# Patient Record
Sex: MALE | Race: WHITE | HISPANIC OR LATINO | URBAN - METROPOLITAN AREA
[De-identification: names, ages, dates, MRNs, and addresses within clinical notes are randomized per-mention and may not be internally consistent; named-entity substitution may affect disease eponyms.]

---

## 2020-07-27 ENCOUNTER — OFFICE VISIT (OUTPATIENT)
Dept: UROLOGY | Facility: CLINIC | Age: 35
End: 2020-07-27
Payer: COMMERCIAL

## 2020-07-27 VITALS
HEIGHT: 66 IN | DIASTOLIC BLOOD PRESSURE: 86 MMHG | SYSTOLIC BLOOD PRESSURE: 124 MMHG | WEIGHT: 180.13 LBS | HEART RATE: 78 BPM | BODY MASS INDEX: 28.95 KG/M2

## 2020-07-27 DIAGNOSIS — A63.0 CONDYLOMA ACUMINATA: ICD-10-CM

## 2020-07-27 PROCEDURE — 99999 PR PBB SHADOW E&M-NEW PATIENT-LVL III: ICD-10-PCS | Mod: PBBFAC,,, | Performed by: UROLOGY

## 2020-07-27 PROCEDURE — 99999 PR PBB SHADOW E&M-NEW PATIENT-LVL III: CPT | Mod: PBBFAC,,, | Performed by: UROLOGY

## 2020-07-27 PROCEDURE — 99203 PR OFFICE/OUTPT VISIT, NEW, LEVL III, 30-44 MIN: ICD-10-PCS | Mod: S$GLB,,, | Performed by: UROLOGY

## 2020-07-27 PROCEDURE — 3008F PR BODY MASS INDEX (BMI) DOCUMENTED: ICD-10-PCS | Mod: CPTII,S$GLB,, | Performed by: UROLOGY

## 2020-07-27 PROCEDURE — 99203 OFFICE O/P NEW LOW 30 MIN: CPT | Mod: S$GLB,,, | Performed by: UROLOGY

## 2020-07-27 PROCEDURE — 3008F BODY MASS INDEX DOCD: CPT | Mod: CPTII,S$GLB,, | Performed by: UROLOGY

## 2020-07-27 RX ORDER — PODOFILOX 5 MG/ML
SOLUTION TOPICAL 2 TIMES DAILY
Qty: 3.5 ML | Refills: 1 | Status: SHIPPED | OUTPATIENT
Start: 2020-07-27 | End: 2020-07-27

## 2020-07-27 RX ORDER — PODOFILOX 5 MG/ML
SOLUTION TOPICAL 2 TIMES DAILY
Qty: 3.5 ML | Refills: 1 | Status: SHIPPED | OUTPATIENT
Start: 2020-07-27 | End: 2020-08-06

## 2020-07-27 RX ORDER — PODOFILOX 5 MG/ML
SOLUTION TOPICAL 2 TIMES DAILY
Qty: 3.5 ML | Refills: 1 | Status: SHIPPED | OUTPATIENT
Start: 2020-07-27 | End: 2020-07-27 | Stop reason: SDUPTHER

## 2020-07-27 RX ORDER — PODOFILOX 5 MG/ML
SOLUTION TOPICAL 2 TIMES DAILY
Qty: 3.5 ML | Refills: 1 | OUTPATIENT
Start: 2020-07-27 | End: 2020-07-27 | Stop reason: SDUPTHER

## 2020-07-28 NOTE — PROGRESS NOTES
CC: genital wart    Joel Beard is a 35 y.o. man who is here for the evaluation of penile wart (one wart)    A new pt.  Hx of genital warts in the past.  Was treated with cautery for the growth in the penis and suprapubic area in the past.  Now he has noticed a small lesion on the left side of the penis.  He is originally from [place].  We had a [place]  today for his visit.  He is  for 2 years.  His wife was checked by her OB/ gyn for genital warts and pelvic exam, and she came back negative.  He denies any other STD.    History reviewed. No pertinent past medical history.  History reviewed. No pertinent surgical history.  Social History     Tobacco Use    Smoking status: Never Smoker   Substance Use Topics    Alcohol use: Never     Frequency: Never    Drug use: Never     History reviewed. No pertinent family history.  Allergy:  Review of patient's allergies indicates:  No Known Allergies  Outpatient Encounter Medications as of 7/27/2020   Medication Sig Dispense Refill    podofilox (CONDYLOX) 0.5 % external solution Apply topically 2 (two) times daily. every 12 hours in the morning and evening for 3 days, then withhold for 4 days; repeat cycle up to 4 times. for 10 days 3.5 mL 1    [DISCONTINUED] podofilox (CONDYLOX) 0.5 % external solution Apply topically 2 (two) times daily. every 12 hours in the morning and evening for 3 days, then withhold for 4 days; repeat cycle up to 4 times. for 10 days 3.5 mL 1    [DISCONTINUED] podofilox (CONDYLOX) 0.5 % external solution Apply topically 2 (two) times daily. every 12 hours in the morning and evening for 3 days, then withhold for 4 days; repeat cycle up to 4 times. for 10 days 3.5 mL 1    [DISCONTINUED] podofilox (CONDYLOX) 0.5 % external solution Apply topically 2 (two) times daily. every 12 hours in the morning and evening for 3 days, then withhold for 4 days; repeat cycle up to 4 times. for 10 days 3.5 mL 1     No facility-administered  encounter medications on file as of 7/27/2020.      Review of Systems   ROS  Physical Exam     Vitals:    07/27/20 1003   BP: 124/86   Pulse: 78     Physical Exam  Genitalia:  Scrotum: no rash or lesion  Normal symmetric epididymis without masses  Normal vas palpated  Normal size, symmetric testicles with no masses   Normal urethral meatus with no discharge  Normal circumcised penis with a 5 mm raised growth on the left side of the penile shaft.   Rectal:  Normal perineum and anus upon inspection.  Normal tone, no masses or tenderness;     LABS:  No results found for: PSA, PSADIAG, PSATOTAL, PSAFREE, PSAFREEPCT  No results found for this or any previous visit.  No results found for: CREATININE  No results found for this or any previous visit.  No results found for: LABURIN  No results found for: HGBA1C    Radiology:    Assessment and Plan:  Joel was seen today for penile wart.    Diagnoses and all orders for this visit:    Condyloma acuminata  -     Discontinue: podofilox (CONDYLOX) 0.5 % external solution; Apply topically 2 (two) times daily. every 12 hours in the morning and evening for 3 days, then withhold for 4 days; repeat cycle up to 4 times. for 10 days  -     Discontinue: podofilox (CONDYLOX) 0.5 % external solution; Apply topically 2 (two) times daily. every 12 hours in the morning and evening for 3 days, then withhold for 4 days; repeat cycle up to 4 times. for 10 days  -     Discontinue: podofilox (CONDYLOX) 0.5 % external solution; Apply topically 2 (two) times daily. every 12 hours in the morning and evening for 3 days, then withhold for 4 days; repeat cycle up to 4 times. for 10 days  -     podofilox (CONDYLOX) 0.5 % external solution; Apply topically 2 (two) times daily. every 12 hours in the morning and evening for 3 days, then withhold for 4 days; repeat cycle up to 4 times. for 10 days    options of treatment explained.  Will try a medical therapy using Condylox first for up to 4 wks.  If not  improving, then will do electrical cautery to remove the wart.  All questions answered.    Follow-up:  Follow up if symptoms worsen or fail to improve.

## 2021-07-21 ENCOUNTER — TELEPHONE (OUTPATIENT)
Dept: NEUROLOGY | Facility: CLINIC | Age: 36
End: 2021-07-21
Payer: COMMERCIAL

## 2021-12-24 ENCOUNTER — PATIENT MESSAGE (OUTPATIENT)
Dept: ADMINISTRATIVE | Facility: OTHER | Age: 36
End: 2021-12-24
Payer: COMMERCIAL

## 2021-12-24 ENCOUNTER — HOSPITAL ENCOUNTER (EMERGENCY)
Facility: OTHER | Age: 36
Discharge: HOME | End: 2021-12-24
Attending: EMERGENCY MEDICINE
Payer: COMMERCIAL

## 2021-12-24 VITALS
WEIGHT: 174.19 LBS | TEMPERATURE: 98 F | OXYGEN SATURATION: 98 % | HEART RATE: 80 BPM | RESPIRATION RATE: 16 BRPM | BODY MASS INDEX: 27.99 KG/M2 | HEIGHT: 66 IN | SYSTOLIC BLOOD PRESSURE: 122 MMHG | DIASTOLIC BLOOD PRESSURE: 84 MMHG

## 2021-12-24 DIAGNOSIS — Z20.822 LAB TEST NEGATIVE FOR COVID-19 VIRUS: Primary | ICD-10-CM

## 2021-12-24 LAB
CTP QC/QA: YES
SARS-COV-2 RDRP RESP QL NAA+PROBE: NEGATIVE

## 2021-12-24 PROCEDURE — 99282 EMERGENCY DEPT VISIT SF MDM: CPT | Mod: 25

## 2021-12-24 PROCEDURE — U0002 COVID-19 LAB TEST NON-CDC: HCPCS | Performed by: EMERGENCY MEDICINE

## 2021-12-25 NOTE — ED PROVIDER NOTES
"  Source of History:  Medical record, patient    Chief complaint:  Per triage note: "COVID-19 Concerns (Pt wants covid test, asymptomatic, no acute distress noted. )  "    HPI:    Joel Beard is a 36 y.o. male who presents requesting COVID-19 PCR test for travel.  Denies any symptoms.  He states that his flight to [place] is at about 1100 hrs. He has been vaccinated against COVID19.     This is the extent of the patient's complaints at this time.     ROS:   As per HPI and below:   General: No fever.   HENT: No facial pain.   Eyes: No eye pain.   Cardiovascular: No chest pain.   Respiratory:  No dyspnea.   GI: No abdominal pain. No nausea. No vomiting. No diarrhea.   Skin: No rashes.   Neuro:  No syncope.  No focal deficits.   Musculoskeletal: No extremity pain.  All other systems reviewed and are negative.      Review of patient's allergies indicates:  No Known Allergies    PMH:  As per HPI and below:  No past medical history on file.    No past surgical history on file.    Social History     Tobacco Use    Smoking status: Never Smoker   Substance Use Topics    Alcohol use: Never    Drug use: Never       Physical Exam:    Nursing note and vitals reviewed.  /84 (BP Location: Right arm, Patient Position: Sitting)   Pulse 80   Temp 98.4 °F (36.9 °C) (Oral)   Resp 16   Ht 5' 6" (1.676 m)   Wt 79 kg (174 lb 2.6 oz)   SpO2 98%   BMI 28.11 kg/m²   Constitutional: AAOx3. No distress.  Eyes: EOMI. No discharge. Anicteric.  HENT:   Neck: Normal range of motion. Neck supple.  Cardiovascular: Normal rate.  Regular rhythm.    Pulmonary/Chest: No respiratory distress. Effort normal.  No tachypnea.  Abdominal: No distension and no mass.   Musculoskeletal: Normal range of motion.   Neurological: GCS 15. Alert and oriented to person, place, and time. No gross cranial nerve, light touch or strength deficit. Coordination normal.   Skin: Skin is warm and dry.   EXT: 2+ radial pulses.   Psychiatric: Behavior is " normal. Judgment normal.        MDM:    I decided to obtain the patient's medical records.  Patient is a 36-year-old male with no reported past medical history who presents requesting COVID-19 PCR test for travel.  Pt's  Nucleic Acid Amplification-based [employer] IDNow Point of care COVID-19 test was negative. Unclear if the airline would consider this test sufficient. PCR sample taken and sent, but unclear if it will result in time for his flight.   All questions answered.    ED Course as of 12/24/21 2134   Fri Dec 24, 2021   1843 Asymptomatic.  Requests PCR test for travel to [place] pillar [RC]      ED Course User Index  [RC] Nikolas Ragland MD       Medications - No data to display         Procedures        Diagnostic Impression:    1. Lab test negative for COVID-19 virus         ED Disposition Condition    Discharge Good        ED Prescriptions     None        Follow-up Information    None          Nikolas Ragland MD  12/24/21 2134

## 2022-01-30 ENCOUNTER — HOSPITAL ENCOUNTER (EMERGENCY)
Facility: HOSPITAL | Age: 37
Discharge: HOME | End: 2022-01-30
Attending: EMERGENCY MEDICINE
Payer: COMMERCIAL

## 2022-01-30 VITALS
WEIGHT: 174.19 LBS | TEMPERATURE: 99 F | HEART RATE: 96 BPM | DIASTOLIC BLOOD PRESSURE: 75 MMHG | HEIGHT: 66 IN | OXYGEN SATURATION: 99 % | SYSTOLIC BLOOD PRESSURE: 123 MMHG | RESPIRATION RATE: 18 BRPM | BODY MASS INDEX: 27.99 KG/M2

## 2022-01-30 DIAGNOSIS — R19.7 DIARRHEA, UNSPECIFIED TYPE: Primary | ICD-10-CM

## 2022-01-30 LAB
ALBUMIN SERPL BCP-MCNC: 4.2 G/DL (ref 3.5–5.2)
ALP SERPL-CCNC: 84 U/L (ref 55–135)
ALT SERPL W/O P-5'-P-CCNC: 19 U/L (ref 10–44)
ANION GAP SERPL CALC-SCNC: 12 MMOL/L (ref 8–16)
AST SERPL-CCNC: 30 U/L (ref 10–40)
BASOPHILS # BLD AUTO: 0.02 K/UL (ref 0–0.2)
BASOPHILS NFR BLD: 0.3 % (ref 0–1.9)
BILIRUB SERPL-MCNC: 0.5 MG/DL (ref 0.1–1)
BUN SERPL-MCNC: 16 MG/DL (ref 6–20)
CALCIUM SERPL-MCNC: 9.3 MG/DL (ref 8.7–10.5)
CHLORIDE SERPL-SCNC: 101 MMOL/L (ref 95–110)
CO2 SERPL-SCNC: 21 MMOL/L (ref 23–29)
CREAT SERPL-MCNC: 1.4 MG/DL (ref 0.5–1.4)
CTP QC/QA: YES
DIFFERENTIAL METHOD: ABNORMAL
EOSINOPHIL # BLD AUTO: 0 K/UL (ref 0–0.5)
EOSINOPHIL NFR BLD: 0.3 % (ref 0–8)
ERYTHROCYTE [DISTWIDTH] IN BLOOD BY AUTOMATED COUNT: 13.4 % (ref 11.5–14.5)
EST. GFR  (AFRICAN AMERICAN): >60 ML/MIN/1.73 M^2
EST. GFR  (NON AFRICAN AMERICAN): >60 ML/MIN/1.73 M^2
GLUCOSE SERPL-MCNC: 83 MG/DL (ref 70–110)
HCT VFR BLD AUTO: 52 % (ref 40–54)
HGB BLD-MCNC: 17.4 G/DL (ref 14–18)
IMM GRANULOCYTES # BLD AUTO: 0.03 K/UL (ref 0–0.04)
IMM GRANULOCYTES NFR BLD AUTO: 0.4 % (ref 0–0.5)
LIPASE SERPL-CCNC: 16 U/L (ref 4–60)
LYMPHOCYTES # BLD AUTO: 0.8 K/UL (ref 1–4.8)
LYMPHOCYTES NFR BLD: 10.2 % (ref 18–48)
MCH RBC QN AUTO: 28.7 PG (ref 27–31)
MCHC RBC AUTO-ENTMCNC: 33.5 G/DL (ref 32–36)
MCV RBC AUTO: 86 FL (ref 82–98)
MONOCYTES # BLD AUTO: 1.1 K/UL (ref 0.3–1)
MONOCYTES NFR BLD: 14.2 % (ref 4–15)
NEUTROPHILS # BLD AUTO: 5.7 K/UL (ref 1.8–7.7)
NEUTROPHILS NFR BLD: 74.6 % (ref 38–73)
NRBC BLD-RTO: 0 /100 WBC
PLATELET # BLD AUTO: 236 K/UL (ref 150–450)
PMV BLD AUTO: 9.4 FL (ref 9.2–12.9)
POTASSIUM SERPL-SCNC: 4.2 MMOL/L (ref 3.5–5.1)
PROT SERPL-MCNC: 9 G/DL (ref 6–8.4)
RBC # BLD AUTO: 6.07 M/UL (ref 4.6–6.2)
SARS-COV-2 RDRP RESP QL NAA+PROBE: NEGATIVE
SODIUM SERPL-SCNC: 134 MMOL/L (ref 136–145)
WBC # BLD AUTO: 7.63 K/UL (ref 3.9–12.7)
WBC #/AREA STL HPF: NORMAL /[HPF]

## 2022-01-30 PROCEDURE — 87209 SMEAR COMPLEX STAIN: CPT | Performed by: NURSE PRACTITIONER

## 2022-01-30 PROCEDURE — 85025 COMPLETE CBC W/AUTO DIFF WBC: CPT | Performed by: NURSE PRACTITIONER

## 2022-01-30 PROCEDURE — 87177 OVA AND PARASITES SMEARS: CPT | Performed by: NURSE PRACTITIONER

## 2022-01-30 PROCEDURE — 96360 HYDRATION IV INFUSION INIT: CPT

## 2022-01-30 PROCEDURE — 89055 LEUKOCYTE ASSESSMENT FECAL: CPT | Performed by: NURSE PRACTITIONER

## 2022-01-30 PROCEDURE — 87427 SHIGA-LIKE TOXIN AG IA: CPT | Mod: 59 | Performed by: NURSE PRACTITIONER

## 2022-01-30 PROCEDURE — 87329 GIARDIA AG IA: CPT | Performed by: NURSE PRACTITIONER

## 2022-01-30 PROCEDURE — 87425 ROTAVIRUS AG IA: CPT | Performed by: NURSE PRACTITIONER

## 2022-01-30 PROCEDURE — 87045 FECES CULTURE AEROBIC BACT: CPT | Performed by: NURSE PRACTITIONER

## 2022-01-30 PROCEDURE — U0002 COVID-19 LAB TEST NON-CDC: HCPCS | Performed by: EMERGENCY MEDICINE

## 2022-01-30 PROCEDURE — 99282 PR EMERGENCY DEPT VISIT,LEVEL II: ICD-10-PCS | Mod: CS,,, | Performed by: EMERGENCY MEDICINE

## 2022-01-30 PROCEDURE — 83690 ASSAY OF LIPASE: CPT | Performed by: NURSE PRACTITIONER

## 2022-01-30 PROCEDURE — 80053 COMPREHEN METABOLIC PANEL: CPT | Performed by: NURSE PRACTITIONER

## 2022-01-30 PROCEDURE — 99284 EMERGENCY DEPT VISIT MOD MDM: CPT | Mod: 25

## 2022-01-30 PROCEDURE — 87046 STOOL CULTR AEROBIC BACT EA: CPT | Mod: 59 | Performed by: NURSE PRACTITIONER

## 2022-01-30 PROCEDURE — 99282 EMERGENCY DEPT VISIT SF MDM: CPT | Mod: CS,,, | Performed by: EMERGENCY MEDICINE

## 2022-01-30 PROCEDURE — 87449 NOS EACH ORGANISM AG IA: CPT | Performed by: NURSE PRACTITIONER

## 2022-01-30 PROCEDURE — 82272 OCCULT BLD FECES 1-3 TESTS: CPT | Performed by: NURSE PRACTITIONER

## 2022-01-30 NOTE — ED NOTES
Patient identifiers verified and correct for Mr Feliciano Beard  C/C: Diarrhea SEE NN  APPEARANCE: awake and alert in NAD.  SKIN: warm, dry and intact. No breakdown or bruising.  MUSCULOSKELETAL: Patient moving all extremities spontaneously, no obvious swelling or deformities noted. Ambulates independently.  RESPIRATORY: Denies shortness of breath.Respirations unlabored.   CARDIAC: Denies CP, 2+ distal pulses; no peripheral edema  ABDOMEN: Denies abd pain, reports diarrhea xs3 today   : voids spontaneously, denies difficulty  Neurologic: AAO x 4; follows commands equal strength in all extremities; denies numbness/tingling. Denies dizziness Denies wekaness

## 2022-01-30 NOTE — ED PROVIDER NOTES
Chief complaint:  Diarrhea      HPI:  Joel Beard is a 37 y.o. male presenting for evaluation of diarrhea and rectal pain.  He reports that his diarrhea began on Friday.  He reports watery yellow stools every 2 hours.  On Friday night, it kept him up all night.  He denies any abdominal pain, vomiting or fever.  He has had a decreased appetite and has only been able to eat noodles and mashed potatoes.  He is drinking Gatorade.  He is taking over-the-counter Imodium without improvement.  He recently traveled to [place] and returned on January 10th.  He has not been on any recent antibiotics.  He states that after multiple episodes of diarrhea, he began having rectal pain.  He believes this may be due to wiping.  He is using baby wipes.  He denies any bleeding from the rectum.  He has never had the symptoms in the past.  He denies any other problems or concerns at this time.      Review of patient's allergies indicates:  No Known Allergies    No current facility-administered medications on file prior to encounter.     No current outpatient medications on file prior to encounter.       PMH:  As per HPI and below:  History reviewed. No pertinent past medical history.  History reviewed. No pertinent surgical history.    Social History     Socioeconomic History    Marital status:    Tobacco Use    Smoking status: Never Smoker    Smokeless tobacco: Never Used   Substance and Sexual Activity    Alcohol use: Never    Drug use: Never       History reviewed. No pertinent family history.    Review of Systems  As per HPI and Below  Constitutional: Negative for chills and fever.   HENT: Negative for congestion and sinus pressure.    Eyes: Negative for visual disturbance.   Respiratory: Negative for cough, chest tightness and shortness of breath.    Cardiovascular: Negative for chest pain.   Gastrointestinal:  Positive for diarrhea.  Negative for nausea or vomiting.  Negative for abdominal pain.  Negative for  rectal bleeding.  Positive for rectal pain.  Genitourinary: Negative for flank pain. Negative for dysuria.  Musculoskeletal: Negative for arthralgias and myalgias.   Skin: Negative for rash and wound.   Neurological: Negative for dizziness. Negative for weakness and numbness.   Psychiatric/Behavioral: Negative for confusion.       Physical Exam:    Vitals:    01/30/22 1206   BP: 123/75   Pulse: 96   Resp: 18   Temp:      Nursing note and vitals reviewed.  Constitutional: Patient appears well-developed and well-nourished. Not diaphoretic. No distress.   HENT:   Head: Normocephalic and atraumatic.   Eyes: Conjunctivae are normal. No scleral icterus.   Neck: Normal range of motion. Neck supple.   Cardiovascular: Normal rate, regular rhythm and normal heart sounds.   Pulmonary/Chest: Breath sounds normal. No respiratory distress.  Abdominal:  The abdomen is soft and nondistended.  Normal bowel sounds.  There is no tenderness to palpation.  Rectal exam is unremarkable.  There are no fissures or external hemorrhoids noted.    Musculoskeletal: Normal range of motion. No edema or tenderness.   Neurological: Alert and oriented to person, place, and time. Normal strength. No sensory deficit.   Skin: Skin is warm and dry. No rash noted. No erythema. No pallor.   Psychiatric: Normal mood and affect. Thought content normal.       Labs Reviewed   CBC W/ AUTO DIFFERENTIAL - Abnormal; Notable for the following components:       Result Value    Lymph # 0.8 (*)     Mono # 1.1 (*)     Gran % 74.6 (*)     Lymph % 10.2 (*)     All other components within normal limits   COMPREHENSIVE METABOLIC PANEL - Abnormal; Notable for the following components:    Sodium 134 (*)     CO2 21 (*)     Total Protein 9.0 (*)     All other components within normal limits   OCCULT BLOOD X 1, STOOL - Abnormal; Notable for the following components:    Occult Blood Positive (*)     All other components within normal limits   ROTAVIRUS ANTIGEN, STOOL -  Abnormal; Notable for the following components:    Rotavirus Positive (*)     All other components within normal limits   CLOSTRIDIUM DIFFICILE   CULTURE, STOOL   ENTEROHEMORRHAGIC E.COLI   LIPASE   WBC, STOOL   GIARDIA/CRYPTOSPORIDIUM (EIA)   STOOL EXAM-OVA,CYSTS,PARASITES   SARS-COV-2 RDRP GENE         Imaging Results    None           No results found.        MDM:    37 y.o. male 3 days of watery yellow diarrhea and subsequent rectal pain.  His abdomen is benign on exam.  He denies having had any abdominal pain, nausea or vomiting.  He is afebrile, nontoxic and nondistressed.  He is slightly tachycardic at 109 beats per minute.  He is concerned because he has had limited food and fluid intake over the past several days.  Labs including CBC, CMP, lipase and stool cultures the have been ordered.  Will give IV fluids in the ED.  I do not think that imaging is warranted at this time.    On reassessment the patient states he is feeling better after receiving fluids. He is stable for discharge home with outpatient follow up. He may continue Imodium per package instructions. Strict ED return precautions discussed.    Diagnoses:  The encounter diagnosis was Diarrhea, unspecified type.         Chantel Rabago NP  01/30/22 2035      Attending Attestation:  I discussed the management of the patient with the ANTHONY, but the face-to-face encounter was performed by the ANTHONY.  Irvin.     Jake Bryan MD  01/31/22 2799

## 2022-01-30 NOTE — ED NOTES
Patient states diarrhea x 3 days,  deneis abd pain , deneis bleeding, Immodium at 0300. In [place] from Dec 28-[place] 10.

## 2022-01-31 ENCOUNTER — TELEPHONE (OUTPATIENT)
Dept: EMERGENCY MEDICINE | Facility: HOSPITAL | Age: 37
End: 2022-01-31
Payer: COMMERCIAL

## 2022-01-31 LAB
C DIFF GDH STL QL: NEGATIVE
C DIFF TOX A+B STL QL IA: NEGATIVE
CRYPTOSP AG STL QL IA: NEGATIVE
E COLI SXT1 STL QL IA: NEGATIVE
E COLI SXT2 STL QL IA: NEGATIVE
G LAMBLIA AG STL QL IA: NEGATIVE
OB PNL STL: POSITIVE
RV AG STL QL IA.RAPID: POSITIVE

## 2022-01-31 NOTE — TELEPHONE ENCOUNTER
Patient seen in the ED for diarrhea, Rotavirus test is positive. Patient is Ukrainian speaking, called through  line to explain results. I explained that there is no specific treatment for rotavirus but that ist is very contagious so he should be vigilant about handwashing and sanitizing surfaces. Instructed patient to stay hydrated. He states he is feeling better. All questions answered.

## 2022-02-02 LAB
BACTERIA STL CULT: NORMAL
O+P STL MICRO: NORMAL

## 2024-08-12 ENCOUNTER — HOSPITAL ENCOUNTER (EMERGENCY)
Facility: HOSPITAL | Age: 39
Discharge: HOME | End: 2024-08-12
Attending: EMERGENCY MEDICINE
Payer: COMMERCIAL

## 2024-08-12 VITALS
RESPIRATION RATE: 16 BRPM | OXYGEN SATURATION: 96 % | HEIGHT: 66 IN | DIASTOLIC BLOOD PRESSURE: 71 MMHG | BODY MASS INDEX: 29.05 KG/M2 | SYSTOLIC BLOOD PRESSURE: 139 MMHG | HEART RATE: 73 BPM | TEMPERATURE: 99 F | WEIGHT: 180.75 LBS

## 2024-08-12 DIAGNOSIS — L55.0 SUNBURN OF FIRST DEGREE: Primary | ICD-10-CM

## 2024-08-12 DIAGNOSIS — L29.9 PRURITUS: ICD-10-CM

## 2024-08-12 PROCEDURE — 99283 EMERGENCY DEPT VISIT LOW MDM: CPT

## 2024-08-12 RX ORDER — HYDROXYZINE HYDROCHLORIDE 25 MG/1
25 TABLET, FILM COATED ORAL 4 TIMES DAILY PRN
Qty: 20 TABLET | Refills: 0 | Status: SHIPPED | OUTPATIENT
Start: 2024-08-12

## 2024-08-12 NOTE — ED PROVIDER NOTES
Encounter Date: 8/12/2024       History     Chief Complaint   Patient presents with    Sunburn     Upper back and itching     39-year-old male with no significant past medical history presents with sunburn and itchy skin.  He got a sunburn Saturday.  He has had itching to the shoulders and upper back ever since.  Symptoms were so severe today that he did not go to work.  In the past he was prescribed an allergy medication that improved his symptoms.  Patient denies nausea, vomiting, diarrhea, fever, cough, shortness of breath, chest pain, abdominal pain, or dysuria.  NKDA.  The patients available PMH, PSH, Social History, medications, allergies, and triage vital signs were reviewed just prior to their medical evaluation.    French translation service used throughout.         Review of patient's allergies indicates:  No Known Allergies  History reviewed. No pertinent past medical history.  History reviewed. No pertinent surgical history.  No family history on file.  Social History     Tobacco Use    Smoking status: Never    Smokeless tobacco: Never   Substance Use Topics    Alcohol use: Never    Drug use: Never     Review of Systems   Constitutional:  Negative for fever.   Respiratory:  Negative for cough and shortness of breath.    Cardiovascular:  Negative for chest pain.   Gastrointestinal:  Negative for abdominal pain, diarrhea, nausea and vomiting.   Genitourinary:  Negative for dysuria.   Skin:  Positive for color change and rash.       Physical Exam     Initial Vitals [08/12/24 1409]   BP Pulse Resp Temp SpO2   139/71 73 16 98.8 °F (37.1 °C) 96 %      MAP       --         Physical Exam    Nursing note and vitals reviewed.  Constitutional: He appears well-developed and well-nourished. He is not diaphoretic. No distress.   HENT:   Head: Normocephalic and atraumatic.   Nose: Nose normal.   Eyes: Conjunctivae are normal. Right eye exhibits no discharge. Left eye exhibits no discharge.   Neck: Neck supple.    Normal range of motion.  Cardiovascular:  Normal rate, regular rhythm and normal heart sounds.     Exam reveals no gallop and no friction rub.       No murmur heard.  Pulmonary/Chest: Breath sounds normal. No respiratory distress. He has no wheezes. He has no rhonchi. He has no rales.   Abdominal: He exhibits no distension.   Musculoskeletal:         General: No tenderness or edema. Normal range of motion.      Cervical back: Normal range of motion and neck supple.     Neurological: He is alert and oriented to person, place, and time. He has normal strength. GCS score is 15. GCS eye subscore is 4. GCS verbal subscore is 5. GCS motor subscore is 6.   Skin: Skin is warm and dry. Rash noted. No erythema.   Sunburn to back and shoulders, no blisters, no hives, no cellulitis/abscess   Psychiatric: He has a normal mood and affect. His behavior is normal. Judgment and thought content normal.         ED Course   Procedures  Labs Reviewed - No data to display       Imaging Results    None          Medications - No data to display  Medical Decision Making  39-year-old male presents with sunburn and itchy skin.  Vitals normal.  Physical exam as above.  No indication for labs or imaging.  Doubt SJS/TENS/SSS or any acute life threat.  Doubt candida, cellulitis, allergic reaction.  Will treat as outpatient with hydroxyzine and solarcaine.  Patient will call their primary physician tomorrow to schedule close follow-up. Patient will return to ED for worsening symptoms, inability to eat/drink, fever greater than 100.4, or any other concerns. Did bedside teaching with return precautions.  All questions answered.  The patient acknowledges understanding.  Gave written and verbal discharge instructions.     Risk  Prescription drug management.  Diagnosis or treatment significantly limited by social determinants of health.                                      Clinical Impression:  Final diagnoses:  [L55.0] Sunburn of first degree  (Primary)  [L29.9] Pruritus          ED Disposition Condition    Discharge Stable          ED Prescriptions       Medication Sig Dispense Start Date End Date Auth. Provider    hydrOXYzine HCL (ATARAX) 25 MG tablet Take 1 tablet (25 mg total) by mouth 4 (four) times daily as needed for Itching. 20 tablet 8/12/2024 -- Cosme Archer MD          Follow-up Information       Follow up With Specialties Details Why Contact Info    Follow up with primary physician as soon as possible.  Call tomorrow for an appointment.        [facility] Emergency Medicine  Return to ED for worsening symptoms, inability to eat/drink, fever greater than 100.4, or any other concerns. [address]  [facility] [id number] [phone]             Cosme Archer MD  08/12/24 2169

## 2024-08-12 NOTE — ED NOTES
APPEARANCE: Patient in NAD. Behavior is appropriate for age and condition.  NEURO: Awake, alert, and aware. Pupils equal and round. Afebrile.  HEENT: Head symmetrical.  CARDIAC: Rate as expected for age and condition.  RESPIRATORY: Respirations even , unlabored, normal effort, and normal rate.   GI/: Abdomen soft and non-distended. Adequate bowel sounds auscultated with no tenderness noted on palpation. Pt denies vomiting and diarrhea  NEUROVASCULAR: All extremities are warm and pink with palpable pulses and capillary refill less than 3 seconds.  MUSCULOSKELETAL: Moves all extremities well; no obvious deformities noted.   SKIN: Intact, no bruises, redness noted to upper arms and upper back, no blisters noted; pt reports itchy     SOCIAL: Patient is accompanied by family.

## 2024-08-12 NOTE — DISCHARGE INSTRUCTIONS
Use solarcane with aloe over the counter as directed on packaging.    Do not drink or drive on Hydroxzyine.    Our goal in the emergency department is to always give you outstanding care and exceptional service. You may receive a survey by mail or e-mail in the next week regarding your experience in our ED. We would greatly appreciate your completing and returning the survey. Your feedback provides us with a way to recognize our staff who give very good care and it helps us learn how to improve when your experience was below our aspiration of excellence.

## 2024-08-12 NOTE — FIRST PROVIDER EVALUATION
Medical screening examination initiated.  I have conducted a focused provider triage encounter, findings are as follows:    Brief history of present illness:  sunburn to upper back    There were no vitals filed for this visit.    Pertinent physical exam:  ambulatory    Brief workup plan:  intake    Preliminary workup initiated; this workup will be continued and followed by the physician or advanced practice provider that is assigned to the patient when roomed.

## 2024-11-28 PROCEDURE — 99284 EMERGENCY DEPT VISIT MOD MDM: CPT

## 2024-11-29 ENCOUNTER — HOSPITAL ENCOUNTER (EMERGENCY)
Facility: HOSPITAL | Age: 39
Discharge: HOME | End: 2024-11-29
Attending: EMERGENCY MEDICINE
Payer: COMMERCIAL

## 2024-11-29 VITALS
BODY MASS INDEX: 28.34 KG/M2 | OXYGEN SATURATION: 96 % | DIASTOLIC BLOOD PRESSURE: 74 MMHG | RESPIRATION RATE: 18 BRPM | SYSTOLIC BLOOD PRESSURE: 130 MMHG | WEIGHT: 176.38 LBS | HEIGHT: 66 IN | TEMPERATURE: 98 F | HEART RATE: 94 BPM

## 2024-11-29 DIAGNOSIS — J06.9 UPPER RESPIRATORY TRACT INFECTION, UNSPECIFIED TYPE: Primary | ICD-10-CM

## 2024-11-29 LAB
INFLUENZA A, MOLECULAR: POSITIVE
INFLUENZA B, MOLECULAR: NEGATIVE
SARS-COV-2 RDRP RESP QL NAA+PROBE: NEGATIVE
SPECIMEN SOURCE: ABNORMAL

## 2024-11-29 PROCEDURE — 87635 SARS-COV-2 COVID-19 AMP PRB: CPT | Performed by: PHYSICIAN ASSISTANT

## 2024-11-29 PROCEDURE — 87502 INFLUENZA DNA AMP PROBE: CPT | Performed by: PHYSICIAN ASSISTANT

## 2024-11-29 RX ORDER — PROMETHAZINE HYDROCHLORIDE AND DEXTROMETHORPHAN HYDROBROMIDE 6.25; 15 MG/5ML; MG/5ML
5 SYRUP ORAL EVERY 6 HOURS PRN
Qty: 118 ML | Refills: 0 | Status: SHIPPED | OUTPATIENT
Start: 2024-11-29 | End: 2024-12-09

## 2024-11-29 RX ORDER — PROMETHAZINE HYDROCHLORIDE AND DEXTROMETHORPHAN HYDROBROMIDE 6.25; 15 MG/5ML; MG/5ML
5 SYRUP ORAL EVERY 6 HOURS PRN
Qty: 118 ML | Refills: 0 | Status: SHIPPED | OUTPATIENT
Start: 2024-11-29 | End: 2024-11-29

## 2024-11-29 NOTE — ED PROVIDER NOTES
Encounter Date: 11/28/2024       History     Chief Complaint   Patient presents with    Sinusitis     Pt complaining of cough and sinus congestion x3 days with chills     39 y.o. M with no pertinent PMHx presents to ED with 3 days of URI symptoms.  His symptoms include chills, nasal congestion, runny nose, nonproductive cough, fatigue, headache.  He was taking over-the-counter cold medications without improvement.  He was states it was cough is worse when he lies down.  No known sick contacts. He denies fever, sore throat or shortness of breath.    The history is provided by the patient.     Review of patient's allergies indicates:  No Known Allergies  History reviewed. No pertinent past medical history.  History reviewed. No pertinent surgical history.  No family history on file.  Social History     Tobacco Use    Smoking status: Never    Smokeless tobacco: Never   Substance Use Topics    Alcohol use: Never    Drug use: Never     Review of Systems   Constitutional:  Positive for chills. Negative for fever.   HENT:  Positive for congestion and rhinorrhea. Negative for sore throat.    Respiratory:  Positive for cough. Negative for shortness of breath.        Physical Exam     Initial Vitals [11/28/24 2358]   BP Pulse Resp Temp SpO2   130/74 94 18 98.1 °F (36.7 °C) 96 %      MAP       --         Physical Exam    Nursing note and vitals reviewed.  Constitutional: He appears well-developed and well-nourished. He is not diaphoretic. No distress.   HENT:   Head: Normocephalic and atraumatic.   Nose: Nose normal. Mouth/Throat: Uvula is midline and oropharynx is clear and moist. No oropharyngeal exudate, posterior oropharyngeal edema or posterior oropharyngeal erythema.   Eyes: Conjunctivae and EOM are normal.   Neck: Neck supple.   Cardiovascular:  Normal rate, regular rhythm, normal heart sounds and intact distal pulses.           Pulmonary/Chest: Breath sounds normal. No respiratory distress.   Musculoskeletal:       Cervical back: Neck supple.     Lymphadenopathy:     He has no cervical adenopathy.   Neurological: He is alert and oriented to person, place, and time.   Skin: No rash noted.   Psychiatric: He has a normal mood and affect. Thought content normal.         ED Course   Procedures  Labs Reviewed   INFLUENZA A & B BY MOLECULAR   HEPATITIS C ANTIBODY   HIV 1 / 2 ANTIBODY   SARS-COV-2 RNA AMPLIFICATION, QUAL          Imaging Results    None          Medications - No data to display  Medical Decision Making  39 y.o. M with no pertinent PMHx presents to ED with 3 days of URI symptoms. Nontoxic appearing. Hemodynamically stable. Afebrile. Exam as above.  COVID and flu testing obtained at this time.  My differentials include COVID, flu, other viral illness, bronchitis.  He denies fever, productive cough or shortness of breath concerning for pneumonia.  I offered for patient to wait for his results or to check his results via MyChart.  He would like to check his results via MyChart and be discharged at this time.  I believe this is appropriate given he is hemodynamically stable and over a well-appearing.  Symptomatic cough medication sent to pharmacy of choice. Strict ED precautions given to return immediately for new, worsening, or concerning symptoms    Risk  Prescription drug management.                                      Clinical Impression:  Final diagnoses:  [J06.9] Upper respiratory tract infection, unspecified type (Primary)          ED Disposition Condition    Discharge Stable          ED Prescriptions       Medication Sig Dispense Start Date End Date Auth. Provider    promethazine-dextromethorphan (PROMETHAZINE-DM) 6.25-15 mg/5 mL Syrp  (Status: Discontinued) Take 5 mLs by mouth every 6 (six) hours as needed (cough). 118 mL 11/29/2024 11/29/2024 Rosio Nair PA-C    promethazine-dextromethorphan (PROMETHAZINE-DM) 6.25-15 mg/5 mL Syrp Take 5 mLs by mouth every 6 (six) hours as needed (cough). 118 mL  11/29/2024 12/9/2024 Rosio Nair PA-C          Follow-up Information       Follow up With Specialties Details Why Contact Info    [facility] Emergency Medicine  If symptoms worsen [id number]  [facility] [id number]  [phone]             Rosio Nair PA-C  11/29/24 0108

## 2024-11-29 NOTE — Clinical Note
"Joel Zambrano" Feliciano Beard was seen and treated in our emergency department on 11/28/2024.  He may return to work on 12/02/2024.       If you have any questions or concerns, please don't hesitate to call.      Rosio Nair PA-C"

## 2024-11-29 NOTE — ED TRIAGE NOTES
Joel Beard, a 39 y.o. male presents to the ED w/ complaint of sinusitis. PT reporting congestion and cough x3 days, took tylenol w/o relief. Denies fevers, +chills.     Triage note:  Chief Complaint   Patient presents with    Sinusitis     Pt complaining of cough and sinus congestion x3 days with chills     Review of patient's allergies indicates:  No Known Allergies  History reviewed. No pertinent past medical history.   Patient identifiers for Joel Beard checked and correct.    LOC: The patient is awake, alert and aware of environment with an appropriate affect, the patient is oriented x 4 and speaking appropriately.    APPEARANCE: Patient resting comfortably and in no acute distress, patient is clean and well groomed, patient's clothing is properly fastened.    SKIN: The skin is warm and dry, color consistent with ethnicity, patient has normal skin turgor and moist mucus membranes, skin intact, no breakdown or bruising noted.    MUSCULOSKELETAL: Patient moving all extremities well, no obvious swelling or deformities noted.    CARDIAC: Patient has a normal rate and rhythm, no periphreal edema noted, capillary refill < 3 seconds.    ABDOMEN: Soft and non tender to palpation, no distention noted. Patient denies any nausea, vomiting, diarrhea, or constipation.     NEUROLOGIC: Eyes open spontaneously, PERRL, behavior appropriate to situation, follows commands, facial expression symmetrical, bilateral hand grasp equal and even, purposeful motor response noted, normal sensation in all extremities.     HEENT: No abnormalities noted. White sclera and pupils equal round and reactive to light. Denies headache, dizziness.     : Pt voids independently, denies dysuria, hematuria, frequency.

## 2024-11-29 NOTE — DISCHARGE INSTRUCTIONS
You were tested today for COVID and influenza. I recommend that you check your results via MyChart    I suspect you have a viral upper respiratory infection.  This is a self-limiting condition and does not require antibiotics.    I recommend Tylenol over-the-counter as needed for pain or fever.  Take as directed    Promethazine DM prescribed today for cough.  Cautioned as this medication causes drowsiness.  Do not drink alcohol or operate heavy machinery while taking this medication    Drink plenty of water and electrolyte containing fluids    Follow up with your PCP if your symptoms do not start to improve in 72 hours    Strict ED precautions given to return immediately for new, worsening, or concerning symptoms